# Patient Record
Sex: FEMALE | Race: WHITE
[De-identification: names, ages, dates, MRNs, and addresses within clinical notes are randomized per-mention and may not be internally consistent; named-entity substitution may affect disease eponyms.]

---

## 2018-04-20 ENCOUNTER — HOSPITAL ENCOUNTER (OUTPATIENT)
Dept: HOSPITAL 17 - PHED | Age: 63
Setting detail: OBSERVATION
LOS: 1 days | Discharge: HOME | End: 2018-04-21
Attending: HOSPITALIST | Admitting: HOSPITALIST
Payer: SELF-PAY

## 2018-04-20 VITALS
DIASTOLIC BLOOD PRESSURE: 84 MMHG | HEART RATE: 82 BPM | RESPIRATION RATE: 18 BRPM | SYSTOLIC BLOOD PRESSURE: 156 MMHG | OXYGEN SATURATION: 98 %

## 2018-04-20 VITALS — WEIGHT: 167.11 LBS | BODY MASS INDEX: 27.84 KG/M2 | HEIGHT: 65 IN

## 2018-04-20 VITALS
RESPIRATION RATE: 16 BRPM | HEART RATE: 98 BPM | TEMPERATURE: 99 F | SYSTOLIC BLOOD PRESSURE: 170 MMHG | DIASTOLIC BLOOD PRESSURE: 81 MMHG | OXYGEN SATURATION: 97 %

## 2018-04-20 VITALS
OXYGEN SATURATION: 98 % | DIASTOLIC BLOOD PRESSURE: 82 MMHG | HEART RATE: 84 BPM | RESPIRATION RATE: 18 BRPM | SYSTOLIC BLOOD PRESSURE: 161 MMHG

## 2018-04-20 VITALS
HEART RATE: 87 BPM | DIASTOLIC BLOOD PRESSURE: 89 MMHG | SYSTOLIC BLOOD PRESSURE: 165 MMHG | OXYGEN SATURATION: 98 % | RESPIRATION RATE: 18 BRPM

## 2018-04-20 DIAGNOSIS — M79.1: ICD-10-CM

## 2018-04-20 DIAGNOSIS — E87.6: ICD-10-CM

## 2018-04-20 DIAGNOSIS — Z79.899: ICD-10-CM

## 2018-04-20 DIAGNOSIS — I10: ICD-10-CM

## 2018-04-20 DIAGNOSIS — R94.31: ICD-10-CM

## 2018-04-20 DIAGNOSIS — G89.29: ICD-10-CM

## 2018-04-20 DIAGNOSIS — M19.90: ICD-10-CM

## 2018-04-20 DIAGNOSIS — F41.9: ICD-10-CM

## 2018-04-20 DIAGNOSIS — J02.9: ICD-10-CM

## 2018-04-20 DIAGNOSIS — R53.1: Primary | ICD-10-CM

## 2018-04-20 LAB
ALBUMIN SERPL-MCNC: 3.5 GM/DL (ref 3.4–5)
ALP SERPL-CCNC: 86 U/L (ref 45–117)
ALT SERPL-CCNC: 25 U/L (ref 10–53)
AST SERPL-CCNC: 16 U/L (ref 15–37)
BACTERIA #/AREA URNS HPF: (no result) /HPF
BASOPHILS # BLD AUTO: 0.1 TH/MM3 (ref 0–0.2)
BASOPHILS NFR BLD: 0.6 % (ref 0–2)
BILIRUB SERPL-MCNC: 0.4 MG/DL (ref 0.2–1)
BUN SERPL-MCNC: 9 MG/DL (ref 7–18)
CALCIUM SERPL-MCNC: 9.8 MG/DL (ref 8.5–10.1)
CHLORIDE SERPL-SCNC: 112 MEQ/L (ref 98–107)
COLOR UR: YELLOW
CREAT SERPL-MCNC: 0.69 MG/DL (ref 0.5–1)
EOSINOPHIL # BLD: 0.1 TH/MM3 (ref 0–0.4)
EOSINOPHIL NFR BLD: 0.7 % (ref 0–4)
ERYTHROCYTE [DISTWIDTH] IN BLOOD BY AUTOMATED COUNT: 13.4 % (ref 11.6–17.2)
GFR SERPLBLD BASED ON 1.73 SQ M-ARVRAT: 86 ML/MIN (ref 89–?)
GLUCOSE SERPL-MCNC: 114 MG/DL (ref 74–106)
GLUCOSE UR STRIP-MCNC: (no result) MG/DL
HCO3 BLD-SCNC: 24.9 MEQ/L (ref 21–32)
HCT VFR BLD CALC: 44.3 % (ref 35–46)
HGB BLD-MCNC: 14.9 GM/DL (ref 11.6–15.3)
HGB UR QL STRIP: (no result)
INR PPP: 1 RATIO
KETONES UR STRIP-MCNC: (no result) MG/DL
LEUKOCYTE ESTERASE UR QL STRIP: (no result) /HPF (ref 0–5)
LYMPHOCYTES # BLD AUTO: 2.1 TH/MM3 (ref 1–4.8)
LYMPHOCYTES NFR BLD AUTO: 24.6 % (ref 9–44)
MAGNESIUM SERPL-MCNC: 1.9 MG/DL (ref 1.5–2.5)
MCH RBC QN AUTO: 30.7 PG (ref 27–34)
MCHC RBC AUTO-ENTMCNC: 33.6 % (ref 32–36)
MCV RBC AUTO: 91.3 FL (ref 80–100)
MONOCYTE #: 0.5 TH/MM3 (ref 0–0.9)
MONOCYTES NFR BLD: 5.7 % (ref 0–8)
NEUTROPHILS # BLD AUTO: 5.7 TH/MM3 (ref 1.8–7.7)
NEUTROPHILS NFR BLD AUTO: 68.4 % (ref 16–70)
NITRITE UR QL STRIP: (no result)
PLATELET # BLD: 412 TH/MM3 (ref 150–450)
PMV BLD AUTO: 7.6 FL (ref 7–11)
PROT SERPL-MCNC: 7.2 GM/DL (ref 6.4–8.2)
PROTHROMBIN TIME: 10 SEC (ref 9.8–11.6)
RBC # BLD AUTO: 4.86 MIL/MM3 (ref 4–5.3)
RBC #/AREA URNS HPF: (no result) /HPF (ref 0–3)
SODIUM SERPL-SCNC: 144 MEQ/L (ref 136–145)
SP GR UR STRIP: 1.01 (ref 1–1.03)
SQUAMOUS #/AREA URNS HPF: (no result) /HPF (ref 0–5)
TROPONIN I SERPL-MCNC: 0.03 NG/ML (ref 0.02–0.05)
URINE LEUKOCYTE ESTERASE: (no result)
WBC # BLD AUTO: 8.5 TH/MM3 (ref 4–11)

## 2018-04-20 PROCEDURE — 85025 COMPLETE CBC W/AUTO DIFF WBC: CPT

## 2018-04-20 PROCEDURE — 87081 CULTURE SCREEN ONLY: CPT

## 2018-04-20 PROCEDURE — 71045 X-RAY EXAM CHEST 1 VIEW: CPT

## 2018-04-20 PROCEDURE — 83690 ASSAY OF LIPASE: CPT

## 2018-04-20 PROCEDURE — 82550 ASSAY OF CK (CPK): CPT

## 2018-04-20 PROCEDURE — 96365 THER/PROPH/DIAG IV INF INIT: CPT

## 2018-04-20 PROCEDURE — 81001 URINALYSIS AUTO W/SCOPE: CPT

## 2018-04-20 PROCEDURE — 87804 INFLUENZA ASSAY W/OPTIC: CPT

## 2018-04-20 PROCEDURE — 87880 STREP A ASSAY W/OPTIC: CPT

## 2018-04-20 PROCEDURE — 85730 THROMBOPLASTIN TIME PARTIAL: CPT

## 2018-04-20 PROCEDURE — 80053 COMPREHEN METABOLIC PANEL: CPT

## 2018-04-20 PROCEDURE — 83735 ASSAY OF MAGNESIUM: CPT

## 2018-04-20 PROCEDURE — 83880 ASSAY OF NATRIURETIC PEPTIDE: CPT

## 2018-04-20 PROCEDURE — 87086 URINE CULTURE/COLONY COUNT: CPT

## 2018-04-20 PROCEDURE — 99285 EMERGENCY DEPT VISIT HI MDM: CPT

## 2018-04-20 PROCEDURE — 84484 ASSAY OF TROPONIN QUANT: CPT

## 2018-04-20 PROCEDURE — 96376 TX/PRO/DX INJ SAME DRUG ADON: CPT

## 2018-04-20 PROCEDURE — G0378 HOSPITAL OBSERVATION PER HR: HCPCS

## 2018-04-20 PROCEDURE — 93005 ELECTROCARDIOGRAM TRACING: CPT

## 2018-04-20 PROCEDURE — 85610 PROTHROMBIN TIME: CPT

## 2018-04-20 PROCEDURE — 96366 THER/PROPH/DIAG IV INF ADDON: CPT

## 2018-04-20 PROCEDURE — 96361 HYDRATE IV INFUSION ADD-ON: CPT

## 2018-04-20 PROCEDURE — 96375 TX/PRO/DX INJ NEW DRUG ADDON: CPT

## 2018-04-20 PROCEDURE — 97161 PT EVAL LOW COMPLEX 20 MIN: CPT

## 2018-04-20 NOTE — RADRPT
EXAM DATE/TIME:  04/20/2018 20:35 

 

[facility] COMPARISON:     

No previous studies available for comparison.

 

                     

INDICATIONS :     

Fever. 

                     

 

MEDICAL HISTORY :     

None.          

 

SURGICAL HISTORY :     

None.   

 

ENCOUNTER:     

Initial                                        

 

ACUITY:     

1 day      

 

PAIN SCORE:     

0/10

 

LOCATION:     

Bilateral chest 

 

FINDINGS:     

A single view of the chest demonstrates the lungs to be symmetrically aerated without evidence of mas
s, infiltrate or effusion.  The cardiomediastinal contours are unremarkable.  Osseous structures are 
intact.

 

CONCLUSION:     No acute disease.  

 

 

 

 Zain Hunt MD on April 20, 2018 at 21:47           

Board Certified Radiologist.

 This report was verified electronically.
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
## 2018-04-21 VITALS
OXYGEN SATURATION: 96 % | HEART RATE: 84 BPM | RESPIRATION RATE: 20 BRPM | DIASTOLIC BLOOD PRESSURE: 89 MMHG | SYSTOLIC BLOOD PRESSURE: 186 MMHG | TEMPERATURE: 98.9 F

## 2018-04-21 VITALS — HEART RATE: 72 BPM

## 2018-04-21 VITALS
HEART RATE: 84 BPM | DIASTOLIC BLOOD PRESSURE: 82 MMHG | RESPIRATION RATE: 18 BRPM | OXYGEN SATURATION: 97 % | SYSTOLIC BLOOD PRESSURE: 151 MMHG

## 2018-04-21 VITALS
OXYGEN SATURATION: 97 % | DIASTOLIC BLOOD PRESSURE: 79 MMHG | SYSTOLIC BLOOD PRESSURE: 166 MMHG | HEART RATE: 70 BPM | TEMPERATURE: 97.4 F | RESPIRATION RATE: 20 BRPM

## 2018-04-21 VITALS
OXYGEN SATURATION: 93 % | TEMPERATURE: 97.7 F | HEART RATE: 64 BPM | SYSTOLIC BLOOD PRESSURE: 150 MMHG | RESPIRATION RATE: 18 BRPM | DIASTOLIC BLOOD PRESSURE: 70 MMHG

## 2018-04-21 VITALS
RESPIRATION RATE: 18 BRPM | SYSTOLIC BLOOD PRESSURE: 144 MMHG | TEMPERATURE: 98 F | OXYGEN SATURATION: 93 % | DIASTOLIC BLOOD PRESSURE: 72 MMHG | HEART RATE: 66 BPM

## 2018-04-21 LAB
ALBUMIN SERPL-MCNC: 3.3 GM/DL (ref 3.4–5)
ALP SERPL-CCNC: 76 U/L (ref 45–117)
ALT SERPL-CCNC: 23 U/L (ref 10–53)
AST SERPL-CCNC: 15 U/L (ref 15–37)
BASOPHILS # BLD AUTO: 0.1 TH/MM3 (ref 0–0.2)
BASOPHILS NFR BLD: 0.8 % (ref 0–2)
BILIRUB SERPL-MCNC: 0.7 MG/DL (ref 0.2–1)
BUN SERPL-MCNC: 8 MG/DL (ref 7–18)
CALCIUM SERPL-MCNC: 9.1 MG/DL (ref 8.5–10.1)
CHLORIDE SERPL-SCNC: 113 MEQ/L (ref 98–107)
CREAT SERPL-MCNC: 0.52 MG/DL (ref 0.5–1)
EOSINOPHIL # BLD: 0.1 TH/MM3 (ref 0–0.4)
EOSINOPHIL NFR BLD: 1.5 % (ref 0–4)
ERYTHROCYTE [DISTWIDTH] IN BLOOD BY AUTOMATED COUNT: 12.7 % (ref 11.6–17.2)
GFR SERPLBLD BASED ON 1.73 SQ M-ARVRAT: 119 ML/MIN (ref 89–?)
GLUCOSE SERPL-MCNC: 88 MG/DL (ref 74–106)
HCO3 BLD-SCNC: 24.2 MEQ/L (ref 21–32)
HCT VFR BLD CALC: 40.1 % (ref 35–46)
HGB BLD-MCNC: 13.3 GM/DL (ref 11.6–15.3)
LYMPHOCYTES # BLD AUTO: 3.2 TH/MM3 (ref 1–4.8)
LYMPHOCYTES NFR BLD AUTO: 39.5 % (ref 9–44)
MCH RBC QN AUTO: 30.3 PG (ref 27–34)
MCHC RBC AUTO-ENTMCNC: 33.2 % (ref 32–36)
MCV RBC AUTO: 91.3 FL (ref 80–100)
MONOCYTE #: 0.6 TH/MM3 (ref 0–0.9)
MONOCYTES NFR BLD: 6.9 % (ref 0–8)
NEUTROPHILS # BLD AUTO: 4 TH/MM3 (ref 1.8–7.7)
NEUTROPHILS NFR BLD AUTO: 51.3 % (ref 16–70)
PLATELET # BLD: 355 TH/MM3 (ref 150–450)
PMV BLD AUTO: 8 FL (ref 7–11)
PROT SERPL-MCNC: 6.6 GM/DL (ref 6.4–8.2)
RBC # BLD AUTO: 4.4 MIL/MM3 (ref 4–5.3)
SODIUM SERPL-SCNC: 145 MEQ/L (ref 136–145)
TROPONIN I SERPL-MCNC: 0.02 NG/ML (ref 0.02–0.05)
WBC # BLD AUTO: 8 TH/MM3 (ref 4–11)

## 2018-04-21 RX ADMIN — CIPROFLOXACIN SCH MLS/HR: 2 INJECTION, SOLUTION INTRAVENOUS at 11:55

## 2018-04-21 RX ADMIN — CIPROFLOXACIN SCH MLS/HR: 2 INJECTION, SOLUTION INTRAVENOUS at 01:38

## 2018-04-21 NOTE — HHI.DCPOC
Discharge Care Plan


Diagnosis:  


(1) Chronic pain


(2) Generalized weakness


Goals to Promote Your Health


* To prevent worsening of your condition and complications


* To maintain your health at the optimal level


Directions to Meet Your Goals


*** Take your medications as prescribed


*** Follow your dietary instruction


*** Follow activity as directed








*** Keep your appointments as scheduled


*** Take your immunizations and boosters as scheduled


*** If your symptoms worsen call your PCP, if no PCP go to Urgent Care Center 

or Emergency Room***


*** Smoking is Dangerous to Your Health. Avoid second hand smoke***


***Call the 24-hour hour crisis hotline for domestic abuse at 1-613.476.8530***











Julia Medina MD Apr 21, 2018 11:21

## 2018-04-21 NOTE — EKG
Date Performed: 04/20/2018       Time Performed: 20:30:41

 

PTAGE:      62 years

 

EKG:      Sinus rhythm 

 

 MODERATE T-WAVE ABNORMALITY, CONSIDER ANTERIOR ISCHEMIA ABNORMAL ECG 

 

NO PREVIOUS TRACING            

 

DOCTOR:   Norberto Langley  Interpretating Date/Time  04/21/2018 16:45:49

## 2018-04-21 NOTE — HHI.HP
__________________________________________________





HPI


Service


[facility]


Primary Care Physician


Teja Frey, 


Admission Diagnosis





abnormal EKG, generalized weakness


Diagnoses:  


Chief Complaint:  


Weakness


Travel History


International Travel<30 Days:  No


Contact w/Intl Traveler <30 Da:  No


Traveled to Known Affected Are:  No


History of Present Illness


This patient is a 62-year-old female with a past history primarily of 

musculoskeletal pain for which her primary doctor has been giving her 

hydrocodone but apparently recently started to try to taper the dose.  Patient 

has had more pain and actually ran out of her hydrocodone in the last 4 days.  

2 weeks ago she says she had a fever and saw her primary doctor and was given 

ciprofloxacin.  She completed the course but then stopped she had some more 

fevers and so she was supposed to follow with her primary care doctor but was 

too tired.  She did come to the emergency room after her significant other 

called the primary doctor that she was too sleepy and lethargic at home.  Here 

she has been evaluated and found to have normal vital signs, normal blood work 

except some mild hypokalemia and normal chest x-ray.  Patient says that she has 

pain and needs to have the pain managed better.  She has been unsuccessful at 

finding a primary pain specialist.  At this time patient appears stiff but 

without any evidence of sepsis or acute findings.  She is agreeable to a trial 

of tramadol for pain management and outpatient referral for pain management 

services.  This is discussed at length with patient, nursing team and 

significant other.





Review of Systems


Constitutional:  DENIES: Diaphoretic episodes, Fatigue, Fever, Weight gain, 

Weight loss, Chills, Dizziness, Change in appetite, Night Sweats


Endocrine:  DENIES: Abnorml menstrual pattern, Heat/cold intolerance, Polydipsia

, Polyuria, Polyphagia


Eyes:  DENIES: Blurred vision, Diplopia, Eye inflammation, Eye pain, Vision loss

, Photosensitivity, Double Vision


Ears, nose, mouth, throat:  DENIES: Tinnitus, Hearing loss, Vertigo, Nasal 

discharge, Oral lesions, Throat pain, Hoarseness, Ear Pain, Running Nose, 

Epistaxis, Sinus Pain, Toothache, Odynophagia


Respiratory:  DENIES: Apneas, Cough, Snoring, Wheezing, Hemoptysis, Sputum 

production, Shortness of breath


Cardiovascular:  DENIES: Chest pain, Palpitations, Syncope, Dyspnea on Exertion

, PND, Lower Extremity Edema, Orthopnea, Claudication


Gastrointestinal:  DENIES: Abdominal pain, Black stools, Bloody stools, 

Constipation, Diarrhea, Nausea, Vomiting, Difficulty Swallowing, Anorexia


Genitourinary:  DENIES: Abnormal vaginal bleeding, Dysmenorrhea, Dyspareunia, 

Sexual dysfunction, Urinary frequency, Urinary incontinence, Urgency, Hematuria

, Dysuria, Nocturia, Vaginal discharge


Musculoskeletal:  COMPLAINS OF: Joint pain, Muscle aches, Stiffness, DENIES: 

Joint Swelling, Back pain, Neck pain


Integumentary:  DENIES: Abnormal pigmentation, Pruritus, Rash, Nail changes, 

Breast masses, Breast skin changes, Nipple discharge


Hematologic/lymphatic:  DENIES: Bruising, Lymphadenopathy


Immunologic/allergic:  DENIES: Eczema, Urticaria


Neurologic:  DENIES: Abnormal gait, Headache, Localized weakness, Paresthesias, 

Seizures, Speech Problems, Tremor, Poor Balance


Psychiatric:  DENIES: Anxiety, Confusion, Mood changes, Depression, 

Hallucinations, Agitation, Suicidal Ideation, Homicidal Ideation, Delusions


Except as stated in HPI:  all other systems reviewed are Neg





Past Family Social History


Past Medical History


Chronic musculoskeletal pain


Chronic narcotic dependency


Dyspepsia


History of migraines 


And sinus troubles


Past Surgical History


Denies


Reported Medications


Reviewed in the EMR, recently her hydrocodone dose had been decreased and the 

frequency have been changed.  She has been out of hydrocodone for 4 days


Allergies:  


Coded Allergies:  


     ampicillin (Unverified  Allergy, Severe, UNKNOWN, 18)


     ibuprofen (Unverified  Allergy, Severe, UNKNOWN, 18)


     penicillin G (Unverified  Allergy, Severe, UNKNOWN, 18)


     diphenhydramine (Unverified  Allergy, Intermediate, BAD REACTION, 18)


Active Ordered Medications


Reviewed in the EMR


Family History


Mother  from cancer at 72 and also had Parkinson's


Father  at 64 from cardiac disease and had polio


Social History


Lives with spouse, smokes frequently although unquantified she says it is about 

half a pack a week, drinks beer daily





Physical Exam


Vital Signs





Vital Signs








  Date Time  Temp Pulse Resp B/P (MAP) Pulse Ox O2 Delivery O2 Flow Rate FiO2


 


18 08:00 97.7 64 18 150/70 (96) 93   


 


18 04:20   20     


 


18 04:00 97.4 70 20 166/79 (108) 97   


 


18 00:54 98.9 84 20 186/89 (121) 96   


 


18 00:30  82 18  97   


 


18 00:29  84 18 151/82 (105) 97 Room Air  


 


18 00:25  72      


 


18 23:26  84 18 161/82 (108) 98 Room Air  


 


18 22:07  82 18 156/84 (108) 98 Room Air  


 


18 20:24  87 18 165/89 (114) 98 Room Air  


 


18 20:24     98 Room Air  


 


18 20:24     98   


 


18 19:53 99.0 98 16 170/81 (110) 97   








Physical Exam


GENERAL: This is a well-nourished, well-developed patient, in no apparent 

distress.


SKIN: No rashes, ecchymoses or lesions. Cool and dry.


HEAD: Atraumatic. Normocephalic. No temporal or scalp tenderness.


EYES: Pupils equal round and reactive. Extraocular motions intact. No scleral 

icterus. No injection or drainage. 


ENT: Severe gingivitis with tartar and multiple caries.  Otherwise nose without 

bleeding, purulent drainage or septal hematoma. Throat without erythema, 

tonsillar hypertrophy or exudate. Uvula midline. Airway patent.


NECK: Trachea midline. No JVD or lymphadenopathy. Supple, nontender, no 

meningeal signs.


CARDIOVASCULAR: Regular rate and rhythm without murmurs, gallops, or rubs. 


RESPIRATORY: Clear to auscultation. Breath sounds equal bilaterally. No wheezes

, rales, or rhonchi.  


GASTROINTESTINAL: Abdomen soft, non-tender, nondistended. No hepato-splenomegaly

, or palpable masses. No guarding.


MUSCULOSKELETAL: Stiff joints, however extremities without clubbing, cyanosis, 

or edema. No joint tenderness, effusion, or edema noted. No calf tenderness. 

Negative Homans sign bilaterally.


NEUROLOGICAL: Awake and alert. Cranial nerves II through XII intact.  Motor and 

sensory grossly within normal limits. Five out of 5 muscle strength in all 

muscle groups.  Normal speech.


Laboratory





Laboratory Tests








Test


  18


20:45 18


23:20 18


06:54


 


White Blood Count 8.5   8.0 


 


Red Blood Count 4.86   4.40 


 


Hemoglobin 14.9   13.3 


 


Hematocrit 44.3   40.1 


 


Mean Corpuscular Volume 91.3   91.3 


 


Mean Corpuscular Hemoglobin 30.7   30.3 


 


Mean Corpuscular Hemoglobin


Concent 33.6 


  


  33.2 


 


 


Red Cell Distribution Width 13.4   12.7 


 


Platelet Count 412   355 


 


Mean Platelet Volume 7.6   8.0 


 


Neutrophils (%) (Auto) 68.4   51.3 


 


Lymphocytes (%) (Auto) 24.6   39.5 


 


Monocytes (%) (Auto) 5.7   6.9 


 


Eosinophils (%) (Auto) 0.7   1.5 


 


Basophils (%) (Auto) 0.6   0.8 


 


Neutrophils # (Auto) 5.7   4.0 


 


Lymphocytes # (Auto) 2.1   3.2 


 


Monocytes # (Auto) 0.5   0.6 


 


Eosinophils # (Auto) 0.1   0.1 


 


Basophils # (Auto) 0.1   0.1 


 


CBC Comment DIFF FINAL   DIFF FINAL 


 


Differential Comment     


 


Prothrombin Time 10.0   


 


Prothromb Time International


Ratio 1.0 


  


  


 


 


Activated Partial


Thromboplast Time 25.6 


  


  


 


 


Blood Urea Nitrogen 9   8 


 


Creatinine 0.69   0.52 


 


Random Glucose 114   88 


 


Total Protein 7.2   6.6 


 


Albumin 3.5   3.3 


 


Calcium Level 9.8   9.1 


 


Magnesium Level 1.9   


 


Alkaline Phosphatase 86   76 


 


Aspartate Amino Transf


(AST/SGOT) 16 


  


  15 


 


 


Alanine Aminotransferase


(ALT/SGPT) 25 


  


  23 


 


 


Total Bilirubin 0.4   0.7 


 


Sodium Level 144   145 


 


Potassium Level 3.9   3.3 


 


Chloride Level 112   113 


 


Carbon Dioxide Level 24.9   24.2 


 


Anion Gap 7   8 


 


Estimat Glomerular Filtration


Rate 86 


  


  119 


 


 


Total Creatine Kinase 54   


 


Troponin I 0.03   0.02 


 


B-Type Natriuretic Peptide 166   


 


Lipase 124   


 


Urine Color  YELLOW  


 


Urine Turbidity  SL CLOUDY  


 


Urine pH  6.5  


 


Urine Specific Gravity  1.010  


 


Urine Protein  NEG  


 


Urine Glucose (UA)  NEG  


 


Urine Ketones  NEG  


 


Urine Occult Blood  NEG  


 


Urine Nitrite  NEG  


 


Urine Bilirubin  NEG  


 


Urine Urobilinogen  0.2  


 


Urine Leukocyte Esterase  SMALL  


 


Urine RBC  0-2  


 


Urine WBC  15-19  


 


Urine Squamous Epithelial


Cells 


  6-8 


  


 


 


Urine Bacteria  FEW  


 


Microscopic Urinalysis Comment


  


  CULTURE


INDICATED 


 














 Date/Time


Source Procedure


Growth Status


 


 


 18 20:45


Throat Group A Streptococcus Screen


Pending Received


 


 18 23:20


Urine Clean Catch Urine Culture


Pending Received








Result Diagram:  


18 [id number]                                                                   

             18 0654





Imaging





Last Impressions








Chest X-Ray 18 Signed





Impressions: 





 Service Date/Time:  2018 20:35 - CONCLUSION: No acute 

disease. 





       Zain Hunt MD 











Assessment and Plan


Problem List:  


(1) Generalized weakness


ICD Code:  R53.1 - Weakness


Status:  Acute


Plan:  Appears stable and chronic in this patient with multiple complaints 

including multiple musculoskeletal pain issues for which her primary doctor had 

been tapering her hydrocodone.  She actually has been off the hydrocodone for 

the last 4 days.  She has no fevers no evidence of sepsis and no source of 

infection at this time.  She recently completed Cipro per her primary care 

doctor.


Continue with PT evaluation


Replace potassium


Follow clinically


If improvement discharge home














Julia Medina MD 2018 11:20

## 2020-01-29 ENCOUNTER — APPOINTMENT (RX ONLY)
Dept: URBAN - METROPOLITAN AREA CLINIC 61 | Facility: CLINIC | Age: 65
Setting detail: DERMATOLOGY
End: 2020-01-29

## 2020-01-29 DIAGNOSIS — L29.8 OTHER PRURITUS: ICD-10-CM

## 2020-01-29 DIAGNOSIS — L82.1 OTHER SEBORRHEIC KERATOSIS: ICD-10-CM

## 2020-01-29 DIAGNOSIS — L29.89 OTHER PRURITUS: ICD-10-CM

## 2020-01-29 DIAGNOSIS — L81.4 OTHER MELANIN HYPERPIGMENTATION: ICD-10-CM

## 2020-01-29 PROCEDURE — ? ORDER TESTS

## 2020-01-29 PROCEDURE — 99202 OFFICE O/P NEW SF 15 MIN: CPT

## 2020-01-29 PROCEDURE — ? PRESCRIPTION

## 2020-01-29 PROCEDURE — ? COUNSELING

## 2020-01-29 PROCEDURE — ? RECOMMENDATIONS

## 2020-01-29 ASSESSMENT — LOCATION DETAILED DESCRIPTION DERM
LOCATION DETAILED: RIGHT CENTRAL BUCCAL CHEEK
LOCATION DETAILED: RIGHT INFERIOR CENTRAL MALAR CHEEK
LOCATION DETAILED: LEFT PROXIMAL DORSAL FOREARM
LOCATION DETAILED: LEFT LATERAL SUPERIOR CHEST
LOCATION DETAILED: LEFT INFERIOR CENTRAL MALAR CHEEK
LOCATION DETAILED: SUPERIOR THORACIC SPINE

## 2020-01-29 ASSESSMENT — LOCATION SIMPLE DESCRIPTION DERM
LOCATION SIMPLE: RIGHT CHEEK
LOCATION SIMPLE: LEFT CHEEK
LOCATION SIMPLE: LEFT FOREARM
LOCATION SIMPLE: UPPER BACK
LOCATION SIMPLE: CHEST

## 2020-01-29 ASSESSMENT — LOCATION ZONE DERM
LOCATION ZONE: ARM
LOCATION ZONE: FACE
LOCATION ZONE: TRUNK

## 2020-01-29 NOTE — PROCEDURE: COUNSELING
Detail Level: Zone
Detail Level: Generalized
Patient Specific Counseling (Will Not Stick From Patient To Patient): Return for punch biopsy if rash appears

## 2020-01-29 NOTE — PROCEDURE: RECOMMENDATIONS
Detail Level: Zone
Recommendation Preamble: The following recommendations were made during the visit:
Recommendations (Free Text): Recommend work up with PCP